# Patient Record
Sex: MALE | Race: OTHER | HISPANIC OR LATINO | Employment: FULL TIME | URBAN - METROPOLITAN AREA
[De-identification: names, ages, dates, MRNs, and addresses within clinical notes are randomized per-mention and may not be internally consistent; named-entity substitution may affect disease eponyms.]

---

## 2020-12-23 ENCOUNTER — HOSPITAL ENCOUNTER (EMERGENCY)
Facility: HOSPITAL | Age: 40
End: 2020-12-25
Attending: EMERGENCY MEDICINE
Payer: COMMERCIAL

## 2020-12-23 DIAGNOSIS — Z00.8 MEDICAL CLEARANCE FOR PSYCHIATRIC ADMISSION: Primary | ICD-10-CM

## 2020-12-23 DIAGNOSIS — T14.91XA SUICIDE ATTEMPT (HCC): ICD-10-CM

## 2020-12-23 LAB
ALBUMIN SERPL BCP-MCNC: 4.4 G/DL (ref 3–5.2)
ALP SERPL-CCNC: 64 U/L (ref 43–122)
ALT SERPL W P-5'-P-CCNC: 54 U/L (ref 9–52)
ANION GAP SERPL CALCULATED.3IONS-SCNC: 13 MMOL/L (ref 5–14)
APAP SERPL-MCNC: <10 UG/ML (ref 10–20)
AST SERPL W P-5'-P-CCNC: 46 U/L (ref 17–59)
BASOPHILS # BLD AUTO: 0 THOUSANDS/ΜL (ref 0–0.1)
BASOPHILS NFR BLD AUTO: 0 % (ref 0–1)
BILIRUB SERPL-MCNC: 0.4 MG/DL
BUN SERPL-MCNC: 13 MG/DL (ref 5–25)
CALCIUM SERPL-MCNC: 9 MG/DL (ref 8.4–10.2)
CHLORIDE SERPL-SCNC: 102 MMOL/L (ref 97–108)
CK SERPL-CCNC: 118 U/L (ref 55–170)
CO2 SERPL-SCNC: 28 MMOL/L (ref 22–30)
CREAT SERPL-MCNC: 0.89 MG/DL (ref 0.7–1.5)
EOSINOPHIL # BLD AUTO: 0.2 THOUSAND/ΜL (ref 0–0.4)
EOSINOPHIL NFR BLD AUTO: 3 % (ref 0–6)
ERYTHROCYTE [DISTWIDTH] IN BLOOD BY AUTOMATED COUNT: 12.9 %
ETHANOL SERPL-MCNC: <10 MG/DL (ref 0–10)
GFR SERPL CREATININE-BSD FRML MDRD: 108 ML/MIN/1.73SQ M
GLUCOSE SERPL-MCNC: 128 MG/DL (ref 70–99)
HCT VFR BLD AUTO: 42.4 % (ref 41–53)
HGB BLD-MCNC: 14.5 G/DL (ref 13.5–17.5)
LIPASE SERPL-CCNC: 200 U/L (ref 23–300)
LYMPHOCYTES # BLD AUTO: 3.5 THOUSANDS/ΜL (ref 0.5–4)
LYMPHOCYTES NFR BLD AUTO: 35 % (ref 25–45)
MAGNESIUM SERPL-MCNC: 2.2 MG/DL (ref 1.6–2.3)
MCH RBC QN AUTO: 30.7 PG (ref 26–34)
MCHC RBC AUTO-ENTMCNC: 34.2 G/DL (ref 31–36)
MCV RBC AUTO: 90 FL (ref 80–100)
MONOCYTES # BLD AUTO: 0.8 THOUSAND/ΜL (ref 0.2–0.9)
MONOCYTES NFR BLD AUTO: 8 % (ref 1–10)
NEUTROPHILS # BLD AUTO: 5.3 THOUSANDS/ΜL (ref 1.8–7.8)
NEUTS SEG NFR BLD AUTO: 54 % (ref 45–65)
PHOSPHATE SERPL-MCNC: 3.9 MG/DL (ref 2.5–4.8)
PLATELET # BLD AUTO: 258 THOUSANDS/UL (ref 150–450)
PMV BLD AUTO: 8.7 FL (ref 8.9–12.7)
POTASSIUM SERPL-SCNC: 3.7 MMOL/L (ref 3.6–5)
PROT SERPL-MCNC: 7.2 G/DL (ref 5.9–8.4)
RBC # BLD AUTO: 4.71 MILLION/UL (ref 4.5–5.9)
SALICYLATES SERPL-MCNC: <1 MG/DL (ref 10–30)
SODIUM SERPL-SCNC: 143 MMOL/L (ref 137–147)
WBC # BLD AUTO: 9.9 THOUSAND/UL (ref 4.5–11)

## 2020-12-23 PROCEDURE — 99285 EMERGENCY DEPT VISIT HI MDM: CPT

## 2020-12-23 PROCEDURE — NC001 PR NO CHARGE: Performed by: EMERGENCY MEDICINE

## 2020-12-23 PROCEDURE — 80320 DRUG SCREEN QUANTALCOHOLS: CPT | Performed by: EMERGENCY MEDICINE

## 2020-12-23 PROCEDURE — 84100 ASSAY OF PHOSPHORUS: CPT | Performed by: EMERGENCY MEDICINE

## 2020-12-23 PROCEDURE — 93005 ELECTROCARDIOGRAM TRACING: CPT

## 2020-12-23 PROCEDURE — 36415 COLL VENOUS BLD VENIPUNCTURE: CPT | Performed by: EMERGENCY MEDICINE

## 2020-12-23 PROCEDURE — 96361 HYDRATE IV INFUSION ADD-ON: CPT

## 2020-12-23 PROCEDURE — 99285 EMERGENCY DEPT VISIT HI MDM: CPT | Performed by: PHYSICIAN ASSISTANT

## 2020-12-23 PROCEDURE — 85025 COMPLETE CBC W/AUTO DIFF WBC: CPT | Performed by: EMERGENCY MEDICINE

## 2020-12-23 PROCEDURE — 96375 TX/PRO/DX INJ NEW DRUG ADDON: CPT

## 2020-12-23 PROCEDURE — 80329 ANALGESICS NON-OPIOID 1 OR 2: CPT | Performed by: EMERGENCY MEDICINE

## 2020-12-23 PROCEDURE — 96374 THER/PROPH/DIAG INJ IV PUSH: CPT

## 2020-12-23 PROCEDURE — 82550 ASSAY OF CK (CPK): CPT | Performed by: EMERGENCY MEDICINE

## 2020-12-23 PROCEDURE — 83735 ASSAY OF MAGNESIUM: CPT | Performed by: EMERGENCY MEDICINE

## 2020-12-23 PROCEDURE — 83690 ASSAY OF LIPASE: CPT | Performed by: EMERGENCY MEDICINE

## 2020-12-23 PROCEDURE — 80053 COMPREHEN METABOLIC PANEL: CPT | Performed by: EMERGENCY MEDICINE

## 2020-12-23 PROCEDURE — 0241U HB NFCT DS VIR RESP RNA 4 TRGT: CPT | Performed by: PHYSICIAN ASSISTANT

## 2020-12-23 RX ORDER — NALOXONE HYDROCHLORIDE 1 MG/ML
INJECTION INTRAMUSCULAR; INTRAVENOUS; SUBCUTANEOUS
Status: COMPLETED
Start: 2020-12-23 | End: 2020-12-23

## 2020-12-23 RX ORDER — METOCLOPRAMIDE HYDROCHLORIDE 5 MG/ML
10 INJECTION INTRAMUSCULAR; INTRAVENOUS ONCE
Status: COMPLETED | OUTPATIENT
Start: 2020-12-23 | End: 2020-12-23

## 2020-12-23 RX ORDER — ONDANSETRON 2 MG/ML
4 INJECTION INTRAMUSCULAR; INTRAVENOUS ONCE
Status: COMPLETED | OUTPATIENT
Start: 2020-12-23 | End: 2020-12-23

## 2020-12-23 RX ORDER — ONDANSETRON 2 MG/ML
INJECTION INTRAMUSCULAR; INTRAVENOUS
Status: COMPLETED
Start: 2020-12-23 | End: 2020-12-23

## 2020-12-23 RX ADMIN — METOCLOPRAMIDE 10 MG: 5 INJECTION, SOLUTION INTRAMUSCULAR; INTRAVENOUS at 23:07

## 2020-12-23 RX ADMIN — SODIUM CHLORIDE 1000 ML: 0.9 INJECTION, SOLUTION INTRAVENOUS at 23:07

## 2020-12-23 RX ADMIN — ONDANSETRON 4 MG: 2 INJECTION INTRAMUSCULAR; INTRAVENOUS at 23:07

## 2020-12-24 PROBLEM — F32.A DEPRESSION: Status: ACTIVE | Noted: 2020-12-24

## 2020-12-24 LAB
AMPHETAMINES SERPL QL SCN: NEGATIVE
ATRIAL RATE: 99 BPM
BARBITURATES UR QL: NEGATIVE
BENZODIAZ UR QL: NEGATIVE
COCAINE UR QL: NEGATIVE
FLUAV RNA RESP QL NAA+PROBE: NEGATIVE
FLUBV RNA RESP QL NAA+PROBE: NEGATIVE
METHADONE UR QL: NEGATIVE
OPIATES UR QL SCN: NEGATIVE
OXYCODONE+OXYMORPHONE UR QL SCN: POSITIVE
P AXIS: 46 DEGREES
PCP UR QL: NEGATIVE
PR INTERVAL: 156 MS
QRS AXIS: 30 DEGREES
QRSD INTERVAL: 98 MS
QT INTERVAL: 354 MS
QTC INTERVAL: 454 MS
RSV RNA RESP QL NAA+PROBE: NEGATIVE
SARS-COV-2 RNA RESP QL NAA+PROBE: NEGATIVE
T WAVE AXIS: 16 DEGREES
THC UR QL: NEGATIVE
VENTRICULAR RATE: 99 BPM

## 2020-12-24 PROCEDURE — 93010 ELECTROCARDIOGRAM REPORT: CPT | Performed by: INTERNAL MEDICINE

## 2020-12-24 PROCEDURE — 80307 DRUG TEST PRSMV CHEM ANLYZR: CPT | Performed by: PHYSICIAN ASSISTANT

## 2020-12-24 PROCEDURE — 96361 HYDRATE IV INFUSION ADD-ON: CPT

## 2020-12-24 RX ORDER — OLANZAPINE 10 MG/1
10 INJECTION, POWDER, LYOPHILIZED, FOR SOLUTION INTRAMUSCULAR EVERY 8 HOURS PRN
Status: CANCELLED | OUTPATIENT
Start: 2020-12-24

## 2020-12-24 RX ORDER — TRAZODONE HYDROCHLORIDE 100 MG/1
50 TABLET ORAL
Status: CANCELLED | OUTPATIENT
Start: 2020-12-24

## 2020-12-24 RX ORDER — RISPERIDONE 0.5 MG/1
0.5 TABLET, ORALLY DISINTEGRATING ORAL
Status: CANCELLED | OUTPATIENT
Start: 2020-12-24

## 2020-12-24 RX ORDER — HYDROXYZINE HYDROCHLORIDE 25 MG/1
25 TABLET, FILM COATED ORAL EVERY 4 HOURS PRN
Status: CANCELLED | OUTPATIENT
Start: 2020-12-24

## 2020-12-24 RX ORDER — OLANZAPINE 5 MG/1
5 TABLET ORAL EVERY 6 HOURS PRN
Status: CANCELLED | OUTPATIENT
Start: 2020-12-24

## 2020-12-24 RX ORDER — IBUPROFEN 400 MG/1
800 TABLET ORAL EVERY 8 HOURS PRN
Status: CANCELLED | OUTPATIENT
Start: 2020-12-24

## 2020-12-24 RX ORDER — LORAZEPAM 1 MG/1
1 TABLET ORAL ONCE
Status: COMPLETED | OUTPATIENT
Start: 2020-12-24 | End: 2020-12-24

## 2020-12-24 RX ORDER — LORAZEPAM 0.5 MG/1
0.5 TABLET ORAL EVERY 6 HOURS PRN
Status: CANCELLED | OUTPATIENT
Start: 2020-12-24

## 2020-12-24 RX ORDER — ACETAMINOPHEN 325 MG/1
650 TABLET ORAL EVERY 4 HOURS PRN
Status: CANCELLED | OUTPATIENT
Start: 2020-12-24

## 2020-12-24 RX ORDER — LISINOPRIL 10 MG/1
10 TABLET ORAL ONCE
Status: COMPLETED | OUTPATIENT
Start: 2020-12-24 | End: 2020-12-24

## 2020-12-24 RX ORDER — ACETAMINOPHEN 325 MG/1
650 TABLET ORAL EVERY 6 HOURS PRN
Status: CANCELLED | OUTPATIENT
Start: 2020-12-24

## 2020-12-24 RX ORDER — LORAZEPAM 1 MG/1
1 TABLET ORAL EVERY 8 HOURS PRN
Status: CANCELLED | OUTPATIENT
Start: 2020-12-24

## 2020-12-24 RX ORDER — HALOPERIDOL 5 MG
5 TABLET ORAL EVERY 6 HOURS PRN
Status: CANCELLED | OUTPATIENT
Start: 2020-12-24

## 2020-12-24 RX ADMIN — LISINOPRIL 10 MG: 10 TABLET ORAL at 03:13

## 2020-12-24 RX ADMIN — LORAZEPAM 1 MG: 1 TABLET ORAL at 14:01

## 2020-12-25 ENCOUNTER — HOSPITAL ENCOUNTER (INPATIENT)
Facility: HOSPITAL | Age: 40
LOS: 4 days | Discharge: HOME | DRG: 881 | End: 2020-12-29
Attending: STUDENT IN AN ORGANIZED HEALTH CARE EDUCATION/TRAINING PROGRAM | Admitting: STUDENT IN AN ORGANIZED HEALTH CARE EDUCATION/TRAINING PROGRAM
Payer: COMMERCIAL

## 2020-12-25 VITALS
RESPIRATION RATE: 18 BRPM | TEMPERATURE: 97.6 F | HEART RATE: 68 BPM | OXYGEN SATURATION: 100 % | DIASTOLIC BLOOD PRESSURE: 86 MMHG | SYSTOLIC BLOOD PRESSURE: 128 MMHG

## 2020-12-25 DIAGNOSIS — I10 ESSENTIAL HYPERTENSION: ICD-10-CM

## 2020-12-25 DIAGNOSIS — Z00.8 MEDICAL CLEARANCE FOR PSYCHIATRIC ADMISSION: ICD-10-CM

## 2020-12-25 DIAGNOSIS — F32.2 CURRENT SEVERE EPISODE OF MAJOR DEPRESSIVE DISORDER WITHOUT PSYCHOTIC FEATURES WITHOUT PRIOR EPISODE (HCC): Primary | ICD-10-CM

## 2020-12-25 RX ORDER — HALOPERIDOL 5 MG
5 TABLET ORAL EVERY 6 HOURS PRN
Status: DISCONTINUED | OUTPATIENT
Start: 2020-12-25 | End: 2020-12-29 | Stop reason: HOSPADM

## 2020-12-25 RX ORDER — LORAZEPAM 0.5 MG/1
0.5 TABLET ORAL EVERY 6 HOURS PRN
Status: DISCONTINUED | OUTPATIENT
Start: 2020-12-25 | End: 2020-12-26

## 2020-12-25 RX ORDER — ACETAMINOPHEN 325 MG/1
650 TABLET ORAL EVERY 4 HOURS PRN
Status: DISCONTINUED | OUTPATIENT
Start: 2020-12-25 | End: 2020-12-29 | Stop reason: HOSPADM

## 2020-12-25 RX ORDER — IBUPROFEN 800 MG/1
800 TABLET ORAL EVERY 8 HOURS PRN
Status: DISCONTINUED | OUTPATIENT
Start: 2020-12-25 | End: 2020-12-29 | Stop reason: HOSPADM

## 2020-12-25 RX ORDER — RISPERIDONE 0.5 MG/1
0.5 TABLET, ORALLY DISINTEGRATING ORAL
Status: DISCONTINUED | OUTPATIENT
Start: 2020-12-25 | End: 2020-12-29 | Stop reason: HOSPADM

## 2020-12-25 RX ORDER — LORAZEPAM 1 MG/1
1 TABLET ORAL EVERY 8 HOURS PRN
Status: DISCONTINUED | OUTPATIENT
Start: 2020-12-25 | End: 2020-12-25 | Stop reason: HOSPADM

## 2020-12-25 RX ORDER — ACETAMINOPHEN 325 MG/1
650 TABLET ORAL EVERY 6 HOURS PRN
Status: DISCONTINUED | OUTPATIENT
Start: 2020-12-25 | End: 2020-12-29 | Stop reason: HOSPADM

## 2020-12-25 RX ORDER — OLANZAPINE 10 MG/1
10 INJECTION, POWDER, LYOPHILIZED, FOR SOLUTION INTRAMUSCULAR EVERY 8 HOURS PRN
Status: DISCONTINUED | OUTPATIENT
Start: 2020-12-25 | End: 2020-12-29 | Stop reason: HOSPADM

## 2020-12-25 RX ORDER — ACETAMINOPHEN 325 MG/1
650 TABLET ORAL EVERY 8 HOURS PRN
Status: DISCONTINUED | OUTPATIENT
Start: 2020-12-25 | End: 2020-12-25 | Stop reason: HOSPADM

## 2020-12-25 RX ORDER — LORAZEPAM 1 MG/1
1 TABLET ORAL EVERY 8 HOURS PRN
Status: DISCONTINUED | OUTPATIENT
Start: 2020-12-25 | End: 2020-12-26

## 2020-12-25 RX ORDER — HYDROXYZINE HYDROCHLORIDE 25 MG/1
25 TABLET, FILM COATED ORAL EVERY 4 HOURS PRN
Status: DISCONTINUED | OUTPATIENT
Start: 2020-12-25 | End: 2020-12-29 | Stop reason: HOSPADM

## 2020-12-25 RX ORDER — OLANZAPINE 5 MG/1
5 TABLET ORAL EVERY 6 HOURS PRN
Status: DISCONTINUED | OUTPATIENT
Start: 2020-12-25 | End: 2020-12-29 | Stop reason: HOSPADM

## 2020-12-25 RX ORDER — IBUPROFEN 400 MG/1
400 TABLET ORAL EVERY 8 HOURS PRN
Status: DISCONTINUED | OUTPATIENT
Start: 2020-12-25 | End: 2020-12-25 | Stop reason: HOSPADM

## 2020-12-25 RX ORDER — TRAZODONE HYDROCHLORIDE 50 MG/1
50 TABLET ORAL
Status: DISCONTINUED | OUTPATIENT
Start: 2020-12-25 | End: 2020-12-29 | Stop reason: HOSPADM

## 2020-12-25 RX ADMIN — LORAZEPAM 1 MG: 1 TABLET ORAL at 12:32

## 2020-12-26 PROBLEM — F32.2 CURRENT SEVERE EPISODE OF MAJOR DEPRESSIVE DISORDER WITHOUT PSYCHOTIC FEATURES WITHOUT PRIOR EPISODE (HCC): Status: ACTIVE | Noted: 2020-12-24

## 2020-12-26 PROBLEM — Z00.8 MEDICAL CLEARANCE FOR PSYCHIATRIC ADMISSION: Status: ACTIVE | Noted: 2020-12-26

## 2020-12-26 PROBLEM — F32.9 MDD (MAJOR DEPRESSIVE DISORDER): Status: ACTIVE | Noted: 2020-12-24

## 2020-12-26 PROBLEM — I10 ESSENTIAL HYPERTENSION: Status: ACTIVE | Noted: 2017-06-22

## 2020-12-26 PROBLEM — E78.1 HYPERTRIGLYCERIDEMIA: Status: ACTIVE | Noted: 2017-06-22

## 2020-12-26 PROCEDURE — 99222 1ST HOSP IP/OBS MODERATE 55: CPT | Performed by: STUDENT IN AN ORGANIZED HEALTH CARE EDUCATION/TRAINING PROGRAM

## 2020-12-26 PROCEDURE — 99253 IP/OBS CNSLTJ NEW/EST LOW 45: CPT | Performed by: PHYSICIAN ASSISTANT

## 2020-12-26 RX ORDER — LORAZEPAM 1 MG/1
1 TABLET ORAL EVERY 8 HOURS PRN
Status: DISCONTINUED | OUTPATIENT
Start: 2020-12-26 | End: 2020-12-29 | Stop reason: HOSPADM

## 2020-12-26 RX ORDER — SERTRALINE HYDROCHLORIDE 25 MG/1
25 TABLET, FILM COATED ORAL ONCE
Status: COMPLETED | OUTPATIENT
Start: 2020-12-26 | End: 2020-12-26

## 2020-12-26 RX ORDER — LISINOPRIL 20 MG/1
20 TABLET ORAL DAILY
Status: DISCONTINUED | OUTPATIENT
Start: 2020-12-26 | End: 2020-12-29 | Stop reason: HOSPADM

## 2020-12-26 RX ADMIN — LISINOPRIL 20 MG: 20 TABLET ORAL at 15:46

## 2020-12-26 RX ADMIN — HYDROXYZINE HYDROCHLORIDE 25 MG: 25 TABLET, FILM COATED ORAL at 12:54

## 2020-12-26 RX ADMIN — TRAZODONE HYDROCHLORIDE 50 MG: 50 TABLET ORAL at 00:20

## 2020-12-26 RX ADMIN — SERTRALINE HYDROCHLORIDE 25 MG: 25 TABLET ORAL at 15:46

## 2020-12-26 NOTE — H&P
Psychiatric Evaluation - [facility] 45 y o  male MRN: [id number]  Unit/Bed#: Mackenzie Carrasco 057-11 Encounter: [id number]    Interview with Telephonic  #[id number]    Chief Complaint: "I woke up at the hospital after I took too many pills of Percocet "    History of Present Illness     Per ED Note by Alexandria Brennan on 12/24/20:  "Patient was brought to the ED by EMS after taking an overdose  Patient reports taking 70 mg  of Percocet and said his intent was to take his life  Drug screen shows positive for Oxycodone urine  Patient reports increased depression, anxiety, and racing thoughts  Patient has been feeling more depressed over the past month or so, but today he really felt alone  Patient reports that his wife threw him out in August and he also recently lost custody of his children  Although initially saying he wanted to leave, after crisis worker explained the severity of him taking action, he was agreeable to signing a 201  Patient has been very cooperative  Doctor agrees with the plan for inpatient treatment "    Patient was admitted to psychiatric unit on a voluntarily 201 commitment basis  Per Admission Interview with Dr New Knott on 12/26/2020:  45 y o    male, single, has 5 children (11, 9, 12, 16, 25 y/o), 2 of his children (13, 17 y/o) live with him, domiciled by himself in [facility], 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, no significant PPH, no past psychiatric hospitalizations, no past suicide attempts, no h/o self-injurious behaviors, no h/o physical aggression, PMH significant for hypertension, no active substance use, presents to [facility] psychiatry unit transferred from [address] [place] [address] ED for inpatient psychiatric hospitalization s/p suicide attempt by overdose of Percocet,with Dayton Rush reporting "I woke up at the hospital after I took too many pills of Percocet "    Per patient, patient reports that his wife left him about a year ago and kids were telling him that he wasn't a good father  He reports that there was a lot of stressors that were piling on  In August, the mother of his children left him  He reports that he never used Percocet before but knew if he took Percocet, it would kill him  He reports that his suicidal thoughts started on Wednesday after an argument with children's mother  He reports he subsequently bought Percocet and overdosed on it  He reports having trouble sleeping over the past few weeks, reports that he has trouble falling asleep, getting restless sleep, sleeping about 3-4 hours per night  He reports appetite has been very bad, reports lacking appetite, lost about 40 pounds in 3 months  He reports feeling very distractible  He reports his boss noticed this past week that he was very distracted  He reports that he is remorseful of suicide attempt and happy to be alive  On psychiatric ROS, patient denies any auditory or visual hallucinations, denies feelings of paranoia  Patient denies any h/o or current manic symptoms  Patient reports worries about children, worries about living situation  He reports panic attacks at times at night  Patient denies any recreational drug use  He reports drinking alcohol about once per week  Historical Information     Past Psychiatric History:   No significant PPH, no past psychiatric hospitalizations, no past suicide attempts, no h/o self-injurious behaviors, no h/o physical aggression  No current outpatient psychiatric providers  Past Psychiatric medication trial: None    Substance Abuse History:  Social History     Tobacco History     Smoking Status  Current Every Day Smoker Smoking Frequency  0 25 packs/day    Smokeless Tobacco Use  Never Used    Tobacco Comment  States he's only been smoking 5 cigs   for the last month          Alcohol History     Alcohol Use Status  Yes Drinks/Week  2 Cans of beer per week Amount  2 0 standard drinks of alcohol/wk Comment  States does not like alcohol          Drug Use     Drug Use Status  Not Currently Comment  Attempted OD off of what he suspected to be Percocet          Sexual Activity     Sexually Active  Not Currently          Activities of Daily Living    Not Asked               Additional Substance Use Detail     Questions Responses    Problems Due to Past Use of Alcohol? No    Problems Due to Past Use of Substances?  No    Substance Use Assessment Denies substance use within the past 12 months    Alcohol Use Frequency Denies use in past 12 months    Cannabis frequency Never used    Comment: Never used on 12/26/2020     Heroin Frequency Denies use in past 12 months    Last Use of Alcohol & Amount Except for 4 beers 12/23/20    Cocaine frequency Never used    Comment: Never used on 12/26/2020     Crack Cocaine Frequency Denies use in past 12 months    Methamphetamine Frequency Denies use in past 12 months    Narcotic Frequency Denies use in past 12 months    Benzodiazepine Frequency Denies use in past 12 months    Amphetamine frequency Denies use in past 12 months    Barbituate Frequency Denies use use in past 12 months    Inhalant frequency Never used    Comment: Never used on 12/26/2020     Hallucinogen frequency Never used    Comment: Never used on 12/26/2020     Ecstasy frequency Never used    Comment: Never used on 12/26/2020     Other drug frequency Never used    Comment: Never used on 12/26/2020     Opiate frequency Denies use in past 12 months    Opiate last use 12/23/20    Comment: 12/23/2020 on 12/26/2020     Last reviewed by Aleksandra Higgins RN on 12/26/2020      Alcohol- drinks about one per week  Cigarettes- about 5-6 cigs per day     I have assessed this patient for substance use within the past 12 months    Family Psychiatric History:   2 children- in counseling  Father's Side- a lot of people have committed suicide    Social History:  Education: 10th grade  , employed working with animals  No access to firearms  No current legal problems    Abuse History: No h/o physical or sexual abuse  Past Medical History:   Diagnosis Date    Depression        Medical Review Of Systems:  Comprehensive ROS was negative except as noted in HPI and abdominal pain and back pain  Meds/Allergies   all current active meds have been reviewed  No Known Allergies    Objective   Vital signs in last 24 hours:  Temp:  [96 2 °F (35 7 °C)-97 9 °F (36 6 °C)] 97 9 °F (36 6 °C)  HR:  [66-72] 72  Resp:  [16-18] 16  BP: (128-156)/(86-99) 156/99    Mental status:  Appearance sitting comfortably in chair, adequate hygiene and grooming, cooperative with interview, fairly well related, dressed in hospital gown   Mood "depressed"   Affect Appears constricted in depressed range, stable, mood-congruent   Speech Normal rate, rhythm, and volume   Thought Processes Linear and goal directed   Associations intact associations   Hallucinations Denies any auditory or visual hallucinations   Thought Content No passive or active suicidal or homicidal ideation, intent, or plan  Orientation Oriented to person, place, time, and situation   Recent and Remote Memory Grossly intact   Attention Span Concentration intact   Intellect Appears to be of Average Intelligence   Insight Insight intact   Judgement judgment was impaired   Muscle Strength Muscle strength and tone were normal   Language Within normal limits   Fund of Knowledge Age appropriate   Pain None       Patient Strengths/Assets: average or above intelligence, capable of independent living, cooperative, communication skills   Patient Barriers/Limitations: marital conflict, poor support system    Lab Results:   I have personally reviewed all pertinent laboratory/tests results    Labs in last 72 hours:   Recent Labs     12/23/20  2301   WBC 9 90   RBC 4 71   HGB 14 5   HCT 42 4      RDW 12 9   NEUTROABS 5 30   SODIUM 143   K 3 7   CL 102   CO2 28   BUN 13   CREATININE 0 89   GLUC 128*   CALCIUM 9 0   AST 46   ALT 54*   ALKPHOS 64   TP 7 2   ALB 4 4   TBILI 0 40         Code Status: Level 1 - Full Code    Assessment/Plan   Principal Problem:    Current severe episode of major depressive disorder without psychotic features without prior episode [facility])  Active Problems:    Hypertriglyceridemia    Essential hypertension    Medical clearance for psychiatric admission    45 y o   male, single, has 5 children (11, 9, 12, 16, 25 y/o), 2 of his children (13, 17 y/o) live with him, domiciled by himself in [facility], 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, no significant PPH, no past psychiatric hospitalizations, no past suicide attempts, no h/o self-injurious behaviors, no h/o physical aggression, PMH significant for hypertension, no active substance use, [facility] to [facility] transferred from [address] [place] [address] ED for inpatient psychiatric hospitalization s/p suicide attempt by overdose of Percocet,with Cheko Solorzano reporting "I woke up at the hospital after I took too many pills of Percocet "    On assessment today, patient with no prior psychiatric history presenting with significantly worsening depressive symptoms over the past year in context of separation from wife and difficulties with relationship with children as well as significant financial stressors leading up to a suicide attempt via overdose of multiple tablets of Percocet a few days ago leading up to hospitalization, in psychosocial context of marital stressors, housing stressors  Given severity symptoms and recent suicide attempt, patient is imminent risk to harm self would benefit from inpatient psychiatric hospitalization at this time      Plan:   Risks, benefits and possible side effects of Medications:   Risks, benefits, and possible side effects of medications explained to patient and patient verbalizes understanding  Plan:  1  Admit to [place] [id number] on 201 status for safety and treatment of MDD  2  No 1:1 CO needed at this time as patient feels safe on the unit  3  Psych- Will start Zoloft 25 mg daily for today, will titrate Zoloft to 50 mg daily starting tomorrow for mood and anxiety symptoms  May use Ativan 1 mg prn anxiety  4  Medical- Re-started Lisinopril 20 mg daily for hypertension  Reviewed admission labwork  5  CW to work outpatient dispo planning  Certification: Estimated length of stay: More than 2 midnights  I certify that inpatient services are medically necessary for this patient for a duration of greater that 2 midnights  See H&P and MD Progress Notes for additional information about the patient's course of treatment

## 2020-12-26 NOTE — NURSING NOTE
Pt was able to fall asleep shortly following completion of admission process, remained asleep throughout

## 2020-12-26 NOTE — PROGRESS NOTES
Spoke with patient with assistance of [employer] #[id number] [place]  Pt currently denies active thoughts/feeling of self harm but states the thoughts do continue to come to his head and he has attempted to last Wed  Pt states he currently has high anxiety and was administered prn atarax ,individual states that anxiety is due to his wife leaving and his 5 children not wanting a relationship with him  At this time pt was encourage to continue to work on [employer] as needed and express needs  Will continue to monitor

## 2020-12-26 NOTE — PROGRESS NOTES
Admission assessment completed using translation phone via  #[id number]  Pt  Is a mostly Indonesian-speaking 201 from [address] [place] [address], S/P an OD of 70 mg of percocet & 4 bottles of beer on 20  Pt  Was found unresponsive & Narcan was used on him  He denies any past Suicide attempt, is remorseful & makes a verbal safety contract  He also denies HI & A/V Hallucinations, admitting only to feeling that others  are speaking about him or laughing at him, NOT related to the language barrier  He has no known allergies  His only medical problem is HTN & he also has had a hernia repair  Pt  Indicates that he is remorseful & states that he has not been eating or sleeping well, since he requested custody of 2 of his children, (12 & 16, + a 24 yr  old as well) from [facility], as the mother of his children's  recently   Since moving here, the kids have become "rebellious" & are in therapy  Pt works as a  & still was sending money to their mother, & the situation has made he feel "worthless" "like trash" & feeling as though his daughters don't love him  He c/o rapid thoughts about the entire situation  He has never taken psychiatric medication, nor been in treatment, though now realizes he should have gone into therapy, too, as offered, but felt he could handle it  He is pleasant & cooperative during admission, asking appropriate questions  He denies any drug or alcohol problems  UDS was + for opiates only & Medical alcohol was less than 10  Was given Trazodone 50 mg  Po/PRN sleep at [address]  Good effect obtained, as observed asleep in bed within the hr  Will continue to monitor

## 2020-12-26 NOTE — ASSESSMENT & PLAN NOTE
·  Recommend checking lipid panel this admission   · Not on statin outpatient   · Outpatient follow-up with PCP

## 2020-12-26 NOTE — TREATMENT PLAN
TREATMENT PLAN REVIEW - 613 Keri Bryant 45 y o  1980 male MRN: [id number]    [address] [place] [address] Room / Bed: [address] 682-00 Encounter: [id number]          Admit Date/Time:  12/25/2020 10:55 PM    Treatment Team: Attending Provider: Selvin Kennedy MD; Patient Care Technician: Eligio Holley; Security: Jess Turk; : Hilario Rocha; Security: Jordan Parr;  Registered Nurse: Chad Davis LPN; Patient Care Technician: Johnie Zapien; Registered Nurse: Lin Nieto RN; Registered Nurse: David Hinkle RN; Patient Care Assistant: Pamela Grant    Diagnosis: Principal Problem:    Current severe episode of major depressive disorder without psychotic features without prior episode [facility])  Active Problems:    Hypertriglyceridemia    Essential hypertension    Medical clearance for psychiatric admission    Patient Strengths/Assets: average or above intelligence, capable of independent living, cooperative, communication skills     Patient Barriers/Limitations: marital conflict, poor support system    Short Term Goals: decrease in depressive symptoms, decrease in anxiety symptoms    Long Term Goals: improvement in depression, improvement in anxiety    Progress Towards Goals: starting psychiatric medications as prescribed    Recommended Treatment: medication management, patient medication education, group therapy, milieu therapy, continued Behavioral Health psychiatric evaluation/assessment process     Treatment Frequency: daily medication monitoring, group and milieu therapy daily, monitoring through interdisciplinary rounds, monitoring through weekly patient care conferences    Expected Discharge Date: 5 days - 12/31/2020    Discharge Plan: referral for outpatient medication management with a psychiatrist, referral for outpatient psychotherapy    Treatment Plan Created/Updated By: David Bell MD

## 2020-12-26 NOTE — TREATMENT TEAM
AM rounds: New admit - "primarily Andorran speaking" 201 after intentional OD on 70mg percocet (crushed and snorted)  Unresponsive and received Narcan  Increased anxiety/depression  B/u with SO in August 2020, lost custody of children  Pleasant, calm and cooperative  Slept overnight

## 2020-12-26 NOTE — TREATMENT PLAN
RN will meet with pt and assess for reason of admission  Pt will be encouraged to utilize healthy coping skills and to attend groups

## 2020-12-26 NOTE — ASSESSMENT & PLAN NOTE
· Admission labs reviewed, acceptable  · UDS positive for oxycodone   · COVID-19 negative  · EKG reveals NSR,   · Medically stable for continued inpatient psychiatric treatment

## 2020-12-26 NOTE — PLAN OF CARE
Problem: SELF HARM/SUICIDALITY  Goal: Will have no self-injury during hospital stay  Description: INTERVENTIONS:  - Q 15 MINUTES: Routine safety checks  - Q WAKING SHIFT & PRN: Assess risk to determine if routine checks are adequate to maintain patient safety  - Encourage patient to participate actively in care by formulating a plan to combat response to suicidal ideation, identify supports and resources  Outcome: Progressing     Problem: DEPRESSION  Goal: Will be euthymic at discharge  Description: INTERVENTIONS:  - Administer medication as ordered  - Provide emotional support via 1:1 interaction with staff  - Encourage involvement in milieu/groups/activities  - Monitor for social isolation  Outcome: Progressing     Problem: ANXIETY  Goal: Will report anxiety at manageable levels  Description: INTERVENTIONS:  - Administer medication as ordered  - Teach and encourage coping skills  - Provide emotional support  - Assess patient/family for anxiety and ability to cope  Outcome: Progressing  Goal: By discharge: Patient will verbalize 2 strategies to deal with anxiety  Description: Interventions:  - Identify any obvious source/trigger to anxiety  - Staff will assist patient in applying identified coping technique/skills  - Encourage attendance of scheduled groups and activities  Outcome: Progressing     Problem: DISCHARGE PLANNING  Goal: Discharge to home or other facility with appropriate resources  Description: INTERVENTIONS:  - Identify barriers to discharge w/patient and caregiver  - Arrange for needed discharge resources and transportation as appropriate  - Identify discharge learning needs (meds, wound care, etc )  - Arrange for interpretive services to assist at discharge as needed  - Refer to Case Management Department for coordinating discharge planning if the patient needs post-hospital services based on physician/advanced practitioner order or complex needs related to functional status, cognitive ability, or social support system  Outcome: Progressing

## 2020-12-26 NOTE — NURSING NOTE
Pt napping during evening  Pt calm and cooperative   [id number] used  Pt reported anxiety and depression during the morning and about his situation  Pt reported he feels he is the cause of all his families issues  Reviewed zoloft with pt  Pt denies any questions or concerns

## 2020-12-26 NOTE — CONSULTS
[address] Internal Medicine  Consult- Sukhi Arceo 1980, 45 y o  male MRN: [id number]  Unit/Bed#: Malu Sanchez 175-56 Encounter: [id number]  Primary Care Provider: No primary care provider on file  Date and time admitted to hospital: 12/25/2020 10:55 PM    Inpatient consult for Medical Clearance for [facility] patient  Consult performed by: Ciara Mathews PA-C  Consult ordered by: Volodymyr Yung MD        Medical clearance for psychiatric admission  Assessment & Plan  · Admission labs reviewed, acceptable  · UDS positive for oxycodone   · COVID-19 negative  · EKG reveals NSR,   · Medically stable for continued inpatient psychiatric treatment    Essential hypertension  Assessment & Plan  ·  Continue lisinopril 20 mg daily  · Continue to monitor blood pressure    Hypertriglyceridemia  Assessment & Plan  ·  Recommend checking lipid panel this admission   · Not on statin outpatient   · Outpatient follow-up with PCP      VTE Prophylaxis: Reason for no pharmacologic prophylaxis low risk  / reason for no mechanical VTE prophylaxis ambulatory     Recommendations for Discharge:  ·  discharge timeline per primary team   Routine follow-up with primary care provider  Counseling / Coordination of Care Time: 30 minutes  Greater than 50% of total time spent on patient counseling and coordination of care  Collaboration of Care: Were Recommendations Directly Discussed with Primary Treatment Team? - No     History of Present Illness:    Sukhi Arceo is a 45 y o  male who is originally admitted to the behavioral health service due to   Intentional oxycodone overdose  We are consulted for  Management of chronic medical conditions  Interview was performed with the assistance of [employer]    Patient  Reports a past medical history of hypertension for which he takes 20 mg lisinopril daily, all prior to admission medications should be continued    Patient denies recent travel, illness or sick contacts  Patient denies smoking, drinking or drug use  Available admission lab work and vitals are acceptable  Patient feels a baseline physical health  Patient appears medically stable for inpatient psychiatric treatment at this time  Review of Systems:    Review of Systems   Psychiatric/Behavioral: Positive for self-injury and suicidal ideas  All other systems reviewed and are negative  Past Medical and Surgical History:     Past Medical History:   Diagnosis Date    Depression        No past surgical history on file  Meds/Allergies:    PTA meds:   None       Allergies: No Known Allergies    Social History:     Marital Status: Unknown    Substance Use History:   Social History     Substance and Sexual Activity   Alcohol Use Yes    Alcohol/week: 2 0 standard drinks    Types: 2 Cans of beer per week    Frequency: Monthly or less    Drinks per session: 1 or 2    Comment: States does not like alcohol     Social History     Tobacco Use   Smoking Status Current Every Day Smoker    Packs/day: 0 25   Smokeless Tobacco Never Used   Tobacco Comment    States he's only been smoking 5 cigs  for the last month     Social History     Substance and Sexual Activity   Drug Use Not Currently    Comment: Attempted OD off of what he suspected to be Percocet       Family History:    History reviewed  No pertinent family history  Physical Exam:     Vitals:   Blood Pressure: 130/86 (12/26/20 0726)  Pulse: 66 (12/26/20 0726)  Temperature: (!) 97 4 °F (36 3 °C) (12/26/20 0726)  Temp Source: Tympanic (12/26/20 0726)  Respirations: 16 (12/26/20 0726)  Height: 5' 8" (172 7 cm) (12/25/20 2301)  Weight - Scale: 85 3 kg (188 lb) (12/25/20 2301)  SpO2: 97 % (12/25/20 2301)    Physical Exam  Constitutional:       General: He is awake  He is not in acute distress  HENT:      Head: Normocephalic and atraumatic  Cardiovascular:      Rate and Rhythm: Normal rate and regular rhythm  Heart sounds: No murmur  Pulmonary:      Effort: Pulmonary effort is normal       Breath sounds: Normal breath sounds  Abdominal:      General: There is no distension  Palpations: Abdomen is soft  Musculoskeletal:      Right lower leg: No edema  Left lower leg: No edema  Skin:     General: Skin is warm and dry  Neurological:      Mental Status: He is alert  Comments: CN II-XII grossly intact         Additional Data:     Lab Results: I have personally reviewed pertinent reports  Results from last 7 days   Lab Units 12/23/20  2301   WBC Thousand/uL 9 90   HEMOGLOBIN g/dL 14 5   HEMATOCRIT % 42 4   PLATELETS Thousands/uL 258   NEUTROS PCT % 54   LYMPHS PCT % 35   MONOS PCT % 8   EOS PCT % 3     Results from last 7 days   Lab Units 12/23/20  2301   SODIUM mmol/L 143   POTASSIUM mmol/L 3 7   CHLORIDE mmol/L 102   CO2 mmol/L 28   BUN mg/dL 13   CREATININE mg/dL 0 89   ANION GAP mmol/L 13   CALCIUM mg/dL 9 0   ALBUMIN g/dL 4 4   TOTAL BILIRUBIN mg/dL 0 40   ALK PHOS U/L 64   ALT U/L 54*   AST U/L 46   GLUCOSE RANDOM mg/dL 128*             No results found for: HGBA1C            Imaging: I have personally reviewed pertinent reports  No orders to display       EKG, Pathology, and Other Studies Reviewed on Admission:   · EKG: NSR,     ** Please Note: This note has been constructed using a voice recognition system   **

## 2020-12-27 LAB — TSH SERPL DL<=0.05 MIU/L-ACNC: 0.28 UIU/ML (ref 0.36–3.74)

## 2020-12-27 PROCEDURE — 84443 ASSAY THYROID STIM HORMONE: CPT | Performed by: STUDENT IN AN ORGANIZED HEALTH CARE EDUCATION/TRAINING PROGRAM

## 2020-12-27 PROCEDURE — 99232 SBSQ HOSP IP/OBS MODERATE 35: CPT | Performed by: STUDENT IN AN ORGANIZED HEALTH CARE EDUCATION/TRAINING PROGRAM

## 2020-12-27 RX ADMIN — LISINOPRIL 20 MG: 20 TABLET ORAL at 08:41

## 2020-12-27 RX ADMIN — SERTRALINE HYDROCHLORIDE 50 MG: 50 TABLET ORAL at 08:41

## 2020-12-27 NOTE — PROGRESS NOTES
Brayan Pako has been visible on unit quiet social on approach,with the use of cyacrom   Arti# pt denied SI,HI and depression/anxeity  Pt states," he is doing better and has talked with his son I had a good conversation"  Denies any needs at this time  Will continue to monitor

## 2020-12-27 NOTE — PROGRESS NOTES
Progress Note - [address] [facility] Ext 45 y o  male MRN: [id number]  Unit/Bed#: David Sparks 520-35 Encounter: [id number]    Interviewed via Telephonic Y[employer] interperter #[id number]    Subjective:    Per nursing, patient calm and cooperative worried about family issues, slept through the night, visible on unit, social on approach  Per patient, patient reports doing well today, denying any problems over night or today  He reports that the new medication has been going okay so far  He reports that his mood has been "relaxed," denying feelings of anger or irritability  He reports that he slept well last night, denying trouble falling or staying asleep  He reports appetite has been okay  He reports some anxiety last night  He denies any passive or active suicidal ideation, intent, or plan  He reports some anxiety a bit  Behavior over the last 24 hours:  improved  Medication side effects: No  ROS: no complaints    Objective:    Temp:  [96 2 °F (35 7 °C)-98 °F (36 7 °C)] 97 2 °F (36 2 °C)  HR:  [64-70] 64  Resp:  [17-18] 18  BP: (131-158)/(76-90) 158/90    Mental Status Evaluation:  Appearance:  sitting comfortably in chair, dressed in casual clothing, adequate hygiene and grooming, cooperative with interview, fairly well related   Behavior:  No tics, tremors, or behaviors observed   Speech:  Normal rate, rhythm, and volume   Mood:  "relaxed"   Affect:  Appears mildly constricted in depressed range, stable, mood-congruent   Thought Process:  Linear and goal directed   Associations intact associations   Thought Content:  No passive or active suicidal or homicidal ideation, intent, or plan     Perceptual Disturbances: Denies any auditory or visual hallucinations   Sensorium:  Oriented to person, place, time, and situation   Memory:  recent and remote memory grossly intact   Consciousness:  alert and awake   Attention: attention span and concentration were age appropriate   Insight:  fair   Judgment: fair Gait/Station: normal gait/station   Motor Activity: no abnormal movements       Labs: I have personally reviewed all pertinent laboratory/tests results  Labs in last 72 hours:   Recent Labs     12/27/20  0606   [id number] 0 281*       Progress Toward Goals: Progressing    Recommended Treatment: Continue with group therapy, milieu therapy and occupational therapy  Risks, benefits and possible side effects of Medications:   Risks, benefits, and possible side effects of medications explained to patient and patient verbalizes understanding  Medications: all current active meds have been reviewed  Current Facility-Administered Medications   Medication Dose Route Frequency Provider Last Rate    acetaminophen  650 mg Oral Q6H PRN Keny Michelle, MD      acetaminophen  650 mg Oral Q4H PRN Keny Michelle, MD      haloperidol  5 mg Oral Q6H PRN Keny Michelle, MD      hydrOXYzine HCL  25 mg Oral Q4H PRN Keny Michelle, MD      ibuprofen  800 mg Oral Q8H PRN Keny Michelle, MD      influenza vaccine  0 5 mL Intramuscular Once Keny Michelle, MD      lisinopril  20 mg Oral Daily Keny Michelle, MD      LORazepam  1 mg Oral Q8H PRN Keny Michelle, MD      nicotine polacrilex  4 mg Oral Q2H PRN Keny Michelle, MD      OLANZapine  10 mg Intramuscular Q8H PRN Keny Michelle, MD      OLANZapine  5 mg Oral Q6H PRN Keny Michelle, MD      risperiDONE  0 5 mg Oral Q3H PRN Keny Michelle, MD      sertraline  50 mg Oral Daily Keny Michelle, MD      traZODone  50 mg Oral HS PRN Keny Michelle MD             Assessment/Plan   Principal Problem:    Current severe episode of major depressive disorder without psychotic features without prior episode [facility])  Active Problems:    Hypertriglyceridemia    Essential hypertension    Medical clearance for psychiatric admission    46 y/o Male with MDD, hypertension- continues to have improving mood, still having some anxiety symptoms, some stomach upset today  Plan:  -Continue current med regimen     -Monitor hypertension, may need adjustment of Lisinopril

## 2020-12-27 NOTE — TREATMENT TEAM
AM rounds: Calm, cooperative  Visible in milieu during day, napping on/off throughout evening  Denies SI/HI/AVH  Remorseful for OD  Slept overnight w/ Trazodone

## 2020-12-28 PROCEDURE — 99232 SBSQ HOSP IP/OBS MODERATE 35: CPT | Performed by: STUDENT IN AN ORGANIZED HEALTH CARE EDUCATION/TRAINING PROGRAM

## 2020-12-28 RX ADMIN — SERTRALINE HYDROCHLORIDE 50 MG: 50 TABLET ORAL at 10:47

## 2020-12-28 RX ADMIN — LISINOPRIL 20 MG: 20 TABLET ORAL at 10:47

## 2020-12-28 NOTE — PROGRESS NOTES
Progress Note - Behavioral Health   Elen Almazan 45 y o  male MRN: [id number]  Unit/Bed#: Gal Velasquez 706-47 Encounter: [id number]    Assessment/Plan   Principal Problem:    Current severe episode of major depressive disorder without psychotic features without prior episode [facility])  Active Problems:    Hypertriglyceridemia    Essential hypertension    Medical clearance for psychiatric admission    Spoke with the help of  services [id number]  Subjective: Patient was seen, chart reviewed and case discussed with team  This 29 yo male admitted to inpatient for OD on pills in the context of psychosocial stressors  Patient says that he took pills in after an argument with family as they were not respecting or caring for him  Reports that he spoke with family and cleared their differences  He is feeling much better now  Denies SI/HI/AH/BVH  He is visible on the unit participating in groups and activities       Psychiatric Review of Systems:  Behavior over the last 24 hours:  improved  Sleep: normal  Appetite: normal  Medication side effects: No  ROS: no complaints, all others negative    Current Medications:  Current Facility-Administered Medications   Medication Dose Route Frequency    acetaminophen (TYLENOL) tablet 650 mg  650 mg Oral Q6H PRN    acetaminophen (TYLENOL) tablet 650 mg  650 mg Oral Q4H PRN    haloperidol (HALDOL) tablet 5 mg  5 mg Oral Q6H PRN    hydrOXYzine HCL (ATARAX) tablet 25 mg  25 mg Oral Q4H PRN    ibuprofen (MOTRIN) tablet 800 mg  800 mg Oral Q8H PRN    influenza vaccine, quadrivalent (FLUARIX) IM injection 0 5 mL  0 5 mL Intramuscular Once    lisinopril (ZESTRIL) tablet 20 mg  20 mg Oral Daily    LORazepam (ATIVAN) tablet 1 mg  1 mg Oral Q8H PRN    nicotine polacrilex (NICORETTE) gum 4 mg  4 mg Oral Q2H PRN    OLANZapine (ZyPREXA) IM injection 10 mg  10 mg Intramuscular Q8H PRN    OLANZapine (ZyPREXA) tablet 5 mg  5 mg Oral Q6H PRN    risperiDONE (RisperDAL M-TABS) dispersible tablet 0 5 mg  0 5 mg Oral Q3H PRN    sertraline (ZOLOFT) tablet 50 mg  50 mg Oral Daily    traZODone (DESYREL) tablet 50 mg  50 mg Oral HS PRN       Behavioral Health Medications:   all current active meds have been reviewed, continue current psychiatric medications and current meds:   Current Facility-Administered Medications   Medication Dose Route Frequency    acetaminophen (TYLENOL) tablet 650 mg  650 mg Oral Q6H PRN    acetaminophen (TYLENOL) tablet 650 mg  650 mg Oral Q4H PRN    haloperidol (HALDOL) tablet 5 mg  5 mg Oral Q6H PRN    hydrOXYzine HCL (ATARAX) tablet 25 mg  25 mg Oral Q4H PRN    ibuprofen (MOTRIN) tablet 800 mg  800 mg Oral Q8H PRN    influenza vaccine, quadrivalent (FLUARIX) IM injection 0 5 mL  0 5 mL Intramuscular Once    lisinopril (ZESTRIL) tablet 20 mg  20 mg Oral Daily    LORazepam (ATIVAN) tablet 1 mg  1 mg Oral Q8H PRN    nicotine polacrilex (NICORETTE) gum 4 mg  4 mg Oral Q2H PRN    OLANZapine (ZyPREXA) IM injection 10 mg  10 mg Intramuscular Q8H PRN    OLANZapine (ZyPREXA) tablet 5 mg  5 mg Oral Q6H PRN    risperiDONE (RisperDAL M-TABS) dispersible tablet 0 5 mg  0 5 mg Oral Q3H PRN    sertraline (ZOLOFT) tablet 50 mg  50 mg Oral Daily    traZODone (DESYREL) tablet 50 mg  50 mg Oral HS PRN     Vitals:  Vitals:    12/28/20 1512   BP: 146/79   Pulse: 89   Resp: 17   Temp: 98 1 °F (36 7 °C)   SpO2:        Laboratory results:  I have personally reviewed all pertinent laboratory/tests results      Mental Status Evaluation:  Appearance:  age appropriate and casually dressed   Behavior:  normal   Speech:  normal pitch and normal volume   Mood:  anxious   Affect:  mood-congruent   Language Appropriate   Thought Process:  goal directed and logical   Thought Content:  normal   Perceptual Disturbances: None   Risk Potential: Suicidal Ideations none, Homicidal Ideations none and Potential for Aggression No   Sensorium:  person, place, time/date, situation, day of week, month of year and year   Cognition:  recent and remote memory grossly intact   Consciousness:  alert    Attention: attention span appeared shorter than expected for age   Insight:  fair   Judgment: fair   Gait/Station: normal gait/station   Motor Activity: no abnormal movements     Progress Toward Goals: Progressing slow    Recommended Treatment: Continue with pharmacotherapy, group therapy, milieu therapy and occupational therapy

## 2020-12-28 NOTE — DISCHARGE INSTR - APPOINTMENTS
Earl Burroughs RN, our [employer], will be calling you after your discharge, on the phone number that you provided  She will be available as an additional support, if needed  If you wish to speak with her, you may contact Kecia Molina at [phone]

## 2020-12-28 NOTE — NURSING NOTE
Pt visible on the unit  Spoke language (Arabic) to Adrianne, for extensive time  Stating he feels much better today and only want to go home to be with family and continue working at his job  He has concerns of losing his job if he stays here much longer  He is compliant taking medications, attending groups, observed listening and at times interacting  Calm, cooperative, polite to staff and peers

## 2020-12-28 NOTE — PROGRESS NOTES
Language line utilized #[id number]  Pleasant and appropriate during interaction  Denies SI/HI and hallucinations  States he spoke with his children and had a good conversation and he feels confident now that they reassured him that they love him  Reports mild anxiety, refused PRN medications  Anxious to discharge and return to work  Remorseful for recent OD  Agreeable to flu shot to and denies questions

## 2020-12-28 NOTE — CASE MANAGEMENT
CM met with Pt & utilized the [employer] phone   # [phone] provided English/Macanese interpretation  CM reviewed with Pt possible discharge for tomorrow & he confirmed that he had spoken with his boss, Jen Reeves, & he agreed to provide transportation at noon tomorrow  CM reviewed that a provider, Naty Hilario, had been contacted & they had available to complete an intake next Tues or Thurs, either in person or over the phone  Pt agreed with appointment for Thurs over the phone & asked that he be contacted on Ernesto's phone @ [phone]  Pt said that he is feeling much better since starting the medication  CM & Pt talked about a return to work note & Pt said that he talked to his boss & he will return to work on Monday  Pt said that he spoke with his girlfriend, & C&Y were going to talk to his youngest daughter today  Pt said that C&Y have been to visit them before  CM asked if Pt needed any documentation to provide them, & he said he didn't think so  Pt had no questions about his discharge plans  CM contacted [facility]y Hilario counseling @ [phone] & was able to schedule Pt an intake appointment for 1/7 at 6 PM [facility]  CM outreached to [facility] regarding medication management & the first available appointment is 4/27  CM contacted Pam's office & asked who they refer to for medication management, for Macanese speaking individuals & they reported Dr Dejuan Cohen        CM attempted to contact Dr Mamie Haynes office @ [phone] & was told that they are accepting new patients, however, the  asked that CM call back at 9 AM tomorrow to complete the referral

## 2020-12-28 NOTE — PLAN OF CARE
Problem: SELF HARM/SUICIDALITY  Goal: Will have no self-injury during hospital stay  Description: INTERVENTIONS:  - Q 15 MINUTES: Routine safety checks  - Q WAKING SHIFT & PRN: Assess risk to determine if routine checks are adequate to maintain patient safety  - Encourage patient to participate actively in care by formulating a plan to combat response to suicidal ideation, identify supports and resources  Outcome: Progressing     Problem: DEPRESSION  Goal: Will be euthymic at discharge  Description: INTERVENTIONS:  - Administer medication as ordered  - Provide emotional support via 1:1 interaction with staff  - Encourage involvement in milieu/groups/activities  - Monitor for social isolation  Outcome: Progressing     Problem: ANXIETY  Goal: Will report anxiety at manageable levels  Description: INTERVENTIONS:  - Administer medication as ordered  - Teach and encourage coping skills  - Provide emotional support  - Assess patient/family for anxiety and ability to cope  Outcome: Progressing  Goal: By discharge: Patient will verbalize 2 strategies to deal with anxiety  Description: Interventions:  - Identify any obvious source/trigger to anxiety  - Staff will assist patient in applying identified coping technique/skills  - Encourage attendance of scheduled groups and activities  Outcome: Progressing     Problem: Nutrition/Hydration-ADULT  Goal: Nutrient/Hydration intake appropriate for improving, restoring or maintaining nutritional needs  Description: Monitor and assess patient's nutrition/hydration status for malnutrition  Collaborate with interdisciplinary team and initiate plan and interventions as ordered  Monitor patient's weight and dietary intake as ordered or per policy  Utilize nutrition screening tool and intervene as necessary  Determine patient's food preferences and provide high-protein, high-caloric foods as appropriate       INTERVENTIONS:  - Monitor oral intake, urinary output, labs, and treatment plans  - Assess nutrition and hydration status and recommend course of action  - Evaluate amount of meals eaten  - Assist patient with eating if necessary   - Allow adequate time for meals  - Recommend/ encourage appropriate diets, oral nutritional supplements, and vitamin/mineral supplements  - Order, calculate, and assess calorie counts as needed  - Recommend, monitor, and adjust tube feedings and TPN/PPN based on assessed needs  - Assess need for intravenous fluids  - Provide specific nutrition/hydration education as appropriate  - Include patient/family/caregiver in decisions related to nutrition  Outcome: Progressing

## 2020-12-28 NOTE — CASE MANAGEMENT
CM met with Pt & utilized they [employer] phone   # [phone] provided English/Niuean interpretation  CM & Pt reviewed & signed ROIs for Donald Marie(megha)  Pt is a 44 y/o male who reported that he has 3 older children in [facility] ages 24, 25, & 12  He said that their mom is in long term & her  , so they were sent to [facility] to stay with Pt  Pt said that they arrived, blaming him for everything that happened  Pt said that he has a wife with other kids, ages 11 & 9 & she kicked him out in August, because it was too stressful & too financially hard for them all to be in the same house  Pt said that he is staying with his boss, & he can return there at discharge  Pt is  from his wife & was asked to leave the home in August   Pt said that his parents are living in [facility] & he has 1 brother  Pt reported a family history of multiple family members committing suicide  Pt denied any past inpatient or outpatient treatment for mental health  Pt reported that he has felt depressed in the past, but could get out of it  The said that this time he felt completely alone & he founds pills & took a whole bunch  Pt is in agreement with referrals for therapy & medication management  Pt does not have a PCP & denied any medical concerns  Pt denied any drug or alcohol use; Pt's UDS +oxycodone  Pt reported that he smokes about 8 cigarettes/day  Pt reported that he works for [employer] said that it is [employer] for [employer]  Pt is living with his boss & said that he will need a return to work note at discharge  Pt denied any legal issues  Pt denied having access to firearms  Pt reported that friends help with transportation as needed  Pt reported that his thoughts come crashing down on him in the mornings & his kids tell him he is trash  Pt said he would like to be able to stop those thoughts

## 2020-12-28 NOTE — DISCHARGE INSTR - OTHER ORDERS
[facility] es un servicio de soporte telefónico confidencial de 7 días a la semana atendido por consumidores capacitados de esteban mental  Warmline opera todos los días adryan no puede aceptar [employer] las 2 AM y las 6 AM  Warmline andrew soporte, escucha y puede brindar información sobre los servicios disponibles  W[facility] se especializa en las preocupaciones de los consumidores de esteban mental, todd familias y [place]  Sin embargo, también estamos aquí para cualquier persona que tenga un problema de esteban mental, esté confundido o simplemente no sepa nada acerca de la esteban mental o dónde obtener información  Para comunicarse con Albdaniel [place]wood [phone] y acepta las llamadas de 6:00 a m  a 10:00 a m  y de 4:00 p m  a 12:00 a m  Ysabel Velez is a confidential 7 days/week telephone support service manned by trained mental health consumers  W[facility]line operates daily but is not able to accept calls between 2AM-6AM    W[facility]line provides support, a listening ear and can provide information about available services  [facility] specializes in the concerns of mental health consumers, their families and friends  However, we are also here for anyone who has a mental health concern, is confused about or just doesn't know anything about mental health or where to get information  To reach Ysabel Velez, call [phone] accepts calls between 6:00 AM to 10:00 AM and from 4:00 PM to 12:00 AM      Crisis Text Line es gratis, soporte 24/7 para aquellos en crisis  Envíe un mensaje de texto a Home to [phone] desde cualquier lugar de [place]  Para enviar un mensaje de texto con un consejero de crisis capacitado  Crisis Text Line is free, 24/7 support for those in crisis  Text Home to [phone] from anywhere in the [place] to text with a trained Crisis Counselor      Intervención de Crisis en el [place] de [facility]: servicios telefónicos y de crisis móviles autorizados que brindan evaluaciones de esteban mental a todos los grupos de [place], independientemente de los ingresos o el seguro  Crisis Intervention opera las 24 horas / los 7 días de la [place]  La intervención de crisis del condado de [place] a los consumidores que están experimentando julissa emergencia de esteban mental y carecen de los recursos para ayudarse a sí mismos  La intervención inmediata para las personas suicidas y deprimidas con visitas domiciliarias / Beaula Mast es de máxima prioridad  La crisis se puede contactar al [phone]  [facility] ([facility]) AT [facility] - licensed telephone and mobile crisis services that provide mental health assessments to all age groups regardless of income or insurance  Crisis Intervention operates 24-hour/7 days a week  [address] [place] [address] assists consumer who are experiencing a mental health emergency and lack the resources to assist themselves  Immediate intervention for suicidal and depressed individuals with home visits/outreach being top priority  Crisis can be contacted at [phone]  Bra[place] 2-1-1 para conectarse / obtener ayuda  Información y referencias gratuitas y confidenciales disponibles las 24 horas, los 7 días de la semana: servicios de envejecimiento, servicios para niños y [place], asesoramiento, educación / [place], [employer] / Sharin Venkat / ropa, servicios de esteban, crianza de los hijos, abuso de Marc, grupos de [facility], oportunidades de voluntariado y [place]  Dial 2-1-1 to get connected/get help  Free, confidential information & referral available 24/7: Aging Services, Child & Youth Services, Counseling, Education/Training, Food/Shelter/Clothing, Health Services, Parenting, Substance Abuse, Support Groups, Volunteer Opportunities, & much more  Phone: 2-1-1 or [phone], Web: JWQ [email or web address] [email or web address], Email: [email or web address] [email or web address]        What you need to know aboutcoronavirus disease 2019 (COVID-19)     What is coronavirus disease 2019 (COVID-19)?    Coronavirus disease 2019 (COVID-19) is a respiratory illness that can spread from person to person  The virus that causes COVID-19 is a novel coronavirus that was first identified during an investigation into an outbreak in [place], [place]  Can people in the [place]  get COVID-19? Yes  COVID-19 is spreading from person to person in parts of the [facility]  Risk of infection with COVID-19 is higher for people who are close contacts of someone known to have COVID-19, for example healthcare workers, or household members  Other people at higher risk for infection are those who live in or have recently been in an area with ongoing spread of COVID-19  Learn more about places with ongoing spread at   [facility]  html#geographic  Have there been cases of COVID-19 in the [place] ?   Yes  The first case of COVID-19 in the [place] was reported on January 21, 2020  The current count of cases of COVID-19 in the [place] is available on [place] at [facility]  How does COVID-19 spread? The virus that causes COVID-19 probably emerged from an animal source, but is now spreading from person to person  The virus is thought to spread mainly between people who are in close contact with one another (within about 6 feet) through respiratory droplets produced when an infected person coughs or sneezes  It also may be possible that a person can get COVID-19 by touching a surface or object that has the virus on it and then touching their own mouth, nose, or possibly their eyes, but this is not thought to be the main way the virus spreads  Learn what is known about the spread of newly emerged coronaviruses at [facility]  What are the symptoms of COVID-19? Patients with COVID-19 have had mild to severe respiratory illness with symptoms of   fever   cough   shortness of breath  What are severe complications from this virus? Some patients have pneumonia in both lungs, multi-organ failure and in some cases death  How can I help protect myself? People can help protect themselves from respiratory illness with everyday preventive actions  Avoid close contact with people who are sick  Avoid touching your eyes, nose, and mouth withunwashed hands  Wash your hands often with soap and water for at least 20 seconds  Use an alcohol-based hand  that contains at least 60% alcohol if soap and water are not available  If you are sick, to keep from spreading respiratory illness to others, you should   Stay home when you are sick  Cover your cough or sneeze with a tissue, then throw the tissue in the trash  Clean and disinfect frequently touched objectsand surfaces  What should I do if I recently traveled from an area with ongoing spread of COVID-19? If you have traveled from an affected area, there may be restrictions on your movements for up to 2 weeks  If you develop symptoms during that period (fever, cough, trouble breathing), seek medical advice  Call the office of your health care provider before you go, and tell them about your travel and your symptoms  They will give you instructions on how to get care without exposing other people to your illness  While sick, avoid contact with people, don't go out and delay any travel to reduce the possibility of spreading illness to others  Is there a vaccine? There is currently no vaccine to protect against COVID-19  The best way to prevent infection is to take everyday preventive actions, like avoiding close contact with people who are sick and washing your hands often  Is there a treatment? There is no specific antiviral treatment for COVID-19  People with COVID-19 can seek medical care to helprelieve symptoms    For more information: www cdc gov/HVZVD11MY 356818-J 03/03/2020       What to do if you are sick withcoronavirus disease 2019 (COVID-19)     If you are sick with COVID-19 or suspect you are infected with the virus that causes COVID-19, follow the steps below to help prevent the disease from spreading to people in your home and community  Stay home except to get medical care   You should restrict activities outside your home, except for getting medical care  Do not go to work, school, or public areas  Avoid using public transportation, ride-sharing, or taxis  Separate yourself from other people and animals inyour home  People: As much as possible, you should stay in a specific room and away from other people in your home  Also, you should use a separate bathroom, if available  Animals: Do not handle pets or other animals while sick  See COVID-19 and Animals for more information  Call ahead before visiting your doctor   If you have a medical appointment, call the healthcare provider and tell them that you have or may have COVID-19  This will help the healthcare provider's office take steps to keep other people from getting infected or exposed  Wear a facemask  You should wear a facemask when you are around other people (e g , sharing a room or vehicle) or pets and before you enter a healthcare provider's office  If you are not able to wear a facemask (for example, because it causes trouble breathing), then people who live with you should not stay in the same room with you, or they should wear a facemask if they enteryour room  Cover your coughs and sneezes   Cover your mouth and nose with a tissue when you cough or sneeze  Throw used tissues in a lined trash can; immediately wash your hands with soap and water for at least 20 seconds or clean your hands with an alcohol-based hand  that contains at least 60 to 95% alcohol, covering all surfaces of your hands and rubbing them together until they feel dry  Soap and water should be used preferentially if hands are visibly dirty    Avoid sharing personal household items   You should not share dishes, drinking glasses, cups, eating utensils, towels, or bedding with other people or pets in your home  After using these items, they should be washed thoroughly with soap and water  Clean your hands often  Wash your hands often with soap and water for at least 20 seconds  If soap and water are not available, clean your hands with an alcohol-based hand  that contains at least 60% alcohol, covering all surfaces of your hands and rubbing them together until they feel dry  Soap and water should be used preferentially if hands are visibly dirty  Avoid touching your eyes, nose, and mouth with unwashed hands  Clean all "high-touch" surfaces every day  High touch surfaces include counters, tabletops, doorknobs, bathroom fixtures, toilets, phones, keyboards, tablets, and bedside tables  Also, clean any surfaces that may have blood, stool, or body fluids on them  Use a household cleaning spray or wipe, according to the label instructions  Labels contain instructions for safe and effective use of the cleaning product including precautions you should take when applying the product, such as wearing gloves and making sure you have good ventilation during use of the product  Monitor your symptoms  Seek prompt medical attention if your illness is worsening (e g , difficulty breathing)  Before seeking care, call your healthcare provider and tell them that you have, or are being evaluated for, COVID-19  Put on a facemask before you enter the facility  These steps will help the healthcare provider's office to keep other people in the office or waiting room from getting infectedor exposed  Ask your healthcare provider to call the local or state health department  Persons who are placed under active monitoring or facilitated self-monitoring should follow instructions provided by their local health department or occupational health professionals, as appropriate     If you have a medical emergency and need to call 911, notify the dispatch personnel that you have, or are being evaluated for COVID-19  If possible, put on a facemask before emergency medical services arrive  Discontinuing home isolation  Patients with confirmed COVID-19 should remain under home isolation precautions until the risk of secondary transmission to others is thought to be low  The decision to discontinue home isolation precautions should be made on a case-by-case basis, in consultation with healthcare providers and state and local health departments  For more information: www cdc gov/DLFEN10BQ 669780-I 02/24/2020       Stay home when you are sick,except to get medical care  Wash your hands often with soap and water for at least 20 seconds  Cover your cough or sneeze with a tissue, then throw the tissue in the trash  Clean and disinfect frequently touched objects and surfaces  Avoid touching your eyes, nose, and mouth  STOP THE SPREAD OF GERMS  For more information: www cdc gov/COVID19 Avoid close contact with people who are sick  Help prevent the spread of respiratory diseases like COVID-19

## 2020-12-29 VITALS
SYSTOLIC BLOOD PRESSURE: 141 MMHG | RESPIRATION RATE: 20 BRPM | OXYGEN SATURATION: 97 % | TEMPERATURE: 96.5 F | HEIGHT: 68 IN | DIASTOLIC BLOOD PRESSURE: 87 MMHG | BODY MASS INDEX: 28.34 KG/M2 | WEIGHT: 187 LBS | HEART RATE: 94 BPM

## 2020-12-29 PROCEDURE — 90686 IIV4 VACC NO PRSV 0.5 ML IM: CPT | Performed by: STUDENT IN AN ORGANIZED HEALTH CARE EDUCATION/TRAINING PROGRAM

## 2020-12-29 PROCEDURE — 99239 HOSP IP/OBS DSCHRG MGMT >30: CPT | Performed by: STUDENT IN AN ORGANIZED HEALTH CARE EDUCATION/TRAINING PROGRAM

## 2020-12-29 PROCEDURE — 90471 IMMUNIZATION ADMIN: CPT | Performed by: STUDENT IN AN ORGANIZED HEALTH CARE EDUCATION/TRAINING PROGRAM

## 2020-12-29 RX ORDER — LISINOPRIL 20 MG/1
20 TABLET ORAL DAILY
Qty: 30 TABLET | Refills: 0 | Status: SHIPPED | OUTPATIENT
Start: 2020-12-30

## 2020-12-29 RX ADMIN — INFLUENZA VIRUS VACCINE 0.5 ML: 15; 15; 15; 15 SUSPENSION INTRAMUSCULAR at 09:31

## 2020-12-29 RX ADMIN — SERTRALINE HYDROCHLORIDE 50 MG: 50 TABLET ORAL at 08:34

## 2020-12-29 RX ADMIN — LISINOPRIL 20 MG: 20 TABLET ORAL at 08:34

## 2020-12-29 NOTE — DISCHARGE INSTRUCTIONS
Depresión en adultos mayores   LO QUE NECESITA SABER:   La depresión es julissa afección que causa sentimientos de tristeza o desesperanza que no desaparecen  La persona puede perder interés en las cosas que solía disfrutar  La depresión es común en los adultos [place] no es julissa parte normal del envejecimiento  El tratamiento es muy importante y puede ayudar a mejorar la jodie diaria de la persona  Usted puede ayudar a apoyar a la persona animándola a trabajar con los médicos para controlar la depresión  INSTRUCCIONES SOBRE EL REANNA HOSPITALARIA:   Llame al Jenean Hany de emergencias local (911 en los [place]) si:  · Escucha a la persona hablar de hacerse daño a sí misma o a alguien más  · La persona nichols hecho algo intencionalmente para hacerse daño  Llame al terapeuta o médico de la persona si:  · Piensa que los síntomas de la persona no están mejorando  · Usted nota signos nuevos o la persona le dice que está teniendo síntomas nuevos  · Usted tiene preguntas o inquietudes acerca de la condición o el cuidado de la persona  Dónde buscar más ayuda si bertha que la persona está considerando suicidarse: Lo siguiente está disponible en cualquier momento:  · National Suicide Prevention Lifeline (línea de prevención del suicidio): 5-551.620.1142 (1-800-273-TALK)     · Teléfono directo para hablar de suicidio: 4-735-032-812.390.4475 (0-481-YJVJPAS)     · Para obtener julissa lista de números internacionales: https://save org/find-help/international-resources/    Medicamentos: Es importante que los médicos conozcan todos los medicamentos que la persona está tomando  Standard ayudará a los médicos a saber qué medicamentos recomendar para la persona  La persona también podría necesitar ayuda para establecer recordatorios para dick el medicamento todos los días  · Antidepresivos pueden darse para mejorar o equilibrar el estado de ánimo de la persona   Es posible que deba dick estos medicamentos regina varias semanas antes de empezar a sentirse mejor  · [place] todd medicamentos joes rafael se le haya indicado  Consulte con day médico si usted bertha que day medicamento no le está ayudando o si presenta efectos secundarios  Infórmele si es alérgico a algún medicamento  Mantenga julissa lista actualizada de los [place], las vitaminas y los productos herbales que sheldon  Incluya los siguientes datos de los medicamentos: cantidad, frecuencia y motivo de administración  Traiga con usted la lista o los envases de las píldoras a todd citas de seguimiento  Lleve la lista de los medicamentos con usted en reinier de julissa emergencia  La terapia se utiliza a menudo junto con medicamentos para aliviar la depresión  La terapia es un lugar para que la persona hable sobre todd sentimientos y todo lo que puede estar causando la depresión  La terapia se puede realizar jannie o en christina  También podría realizarse con todd familiares o con day yadira  Lo que puede hacer para ayudar a la persona a controlar la depresión:  · Llame, visite o envíe tarjetas postales a martha persona frecuentemente  Esté atento a la persona tras la pérdida de un cónyuge, un gran amigo o julissa mascota  Los [employer], cumpleaños y aniversarios pueden ser difíciles para Francies Sourav persona después de Francies Sourav pérdida  La pérdida de un cónyuge puede ser dolorosa y [place] para los adultos mayores que estuvieron casados por [employer]  · Ayude a la persona a vincularse con otras personas  Anímela a participar en la comunidad  Algunos ejemplos incluyen la tutoría de un estudiante joven o el voluntariado en julissa organización local  La persona podría necesitar algo de ayuda para usar julissa computadora o crear Leonard Sheehan de correo electrónico para poder estar en contacto con otras personas  También puede ayudar a organizar julissa visita para la persona con day líder religioso o espiritual     · Ayúdele a la persona a conseguir el equipo necesario para mejorar day comodidad y movilidad   Carmelina Colindres son los dispositivos para los oídos, anteojos, libros de [place] donna y [place]  Estos artículos pueden ayudar a la persona a disfrutar de actividades y sentirse más independiente  · Anímelo a hacer cosas nuevas  [place] puede ayudar a la persona a encontrar nuevos intereses o a conocer gente nueva  También puede ayudar a prevenir que se concentre en la depresión  · Anímelo a que siga [employer] y yendo a terapia  Los medicamentos y la terapia pueden ayudar a mejorar la esteban mental de la persona  · Ayude a la persona a hacer ejercicio de Marzetta Manus  El ejercicio puede levantar day estado de ánimo, aumentar day energía y hacer que dormir sea más fácil  Si es posible, ofrézcase para hacer ejercicio con la persona  Por ejemplo, puede programar caminatas con la persona  Puede que a la persona le guste ir a un evento, jose rafael julissa exposición de enrique o un museo  Si la persona no puede caminar, puede disfrutar de un programa de ejercicios realizado en julissa silla  · Anímelo a buscar ayuda para tratar el consumo de drogas o alcohol, de ser necesario  Las drogas y el alcohol pueden aumentar los pensamientos suicidas y hacer que la persona tenga más posibilidades de ponerlos en acción  Acuda a las consultas de control con el terapeuta o médico de la persona jose rafael se lo indicaron: El médico de la persona controlará day progreso en [address]  También monitoreará todd medicamentos si la persona está tomando antidepresivos  El [employer] preguntará si los medicamentos le están ayudando  Dígale acerca de cualquier efecto secundario o problema que usted note en la persona, o que la persona menciona  Podría ser necesario cambiar el tipo o cantidad de [place]  Anote todd preguntas y las de la persona para que recuerde [employer] citas  © Copyright [address] Information is for End User's use only and may not be sold, redistributed or otherwise used for commercial purposes   All illustrations and images included in CareNotes® are the copyrighted property of A D A M , Inc  or [address] [place] [address] es sólo para uso en educación  Day intención no es darle un consejo médico sobre enfermedades o tratamientos  Colsulte con day Katia Rivers farmacéutico antes de seguir cualquier régimen médico para saber si es seguro y efectivo para usted

## 2020-12-29 NOTE — PROGRESS NOTES
Pt relaxing in bed  Agreed to speak with writer  Pt pleasant  Denied all symptoms and ready to go home tomorrow  Asked pt to come to nurse's station if he has any questions or concerns  Pt acknowledged

## 2020-12-29 NOTE — PLAN OF CARE
Problem: SELF HARM/SUICIDALITY  Goal: Will have no self-injury during hospital stay  Description: INTERVENTIONS:  - Q 15 MINUTES: Routine safety checks  - Q WAKING SHIFT & PRN: Assess risk to determine if routine checks are adequate to maintain patient safety  - Encourage patient to participate actively in care by formulating a plan to combat response to suicidal ideation, identify supports and resources  Outcome: Progressing     Problem: DEPRESSION  Goal: Will be euthymic at discharge  Description: INTERVENTIONS:  - Administer medication as ordered  - Provide emotional support via 1:1 interaction with staff  - Encourage involvement in milieu/groups/activities  - Monitor for social isolation  Outcome: Progressing     Problem: ANXIETY  Goal: Will report anxiety at manageable levels  Description: INTERVENTIONS:  - Administer medication as ordered  - Teach and encourage coping skills  - Provide emotional support  - Assess patient/family for anxiety and ability to cope  Outcome: Progressing  Goal: By discharge: Patient will verbalize 2 strategies to deal with anxiety  Description: Interventions:  - Identify any obvious source/trigger to anxiety  - Staff will assist patient in applying identified coping technique/skills  - Encourage attendance of scheduled groups and activities  Outcome: Progressing     Problem: Nutrition/Hydration-ADULT  Goal: Nutrient/Hydration intake appropriate for improving, restoring or maintaining nutritional needs  Description: Monitor and assess patient's nutrition/hydration status for malnutrition  Collaborate with interdisciplinary team and initiate plan and interventions as ordered  Monitor patient's weight and dietary intake as ordered or per policy  Utilize nutrition screening tool and intervene as necessary  Determine patient's food preferences and provide high-protein, high-caloric foods as appropriate       INTERVENTIONS:  - Monitor oral intake, urinary output, labs, and treatment plans  - Assess nutrition and hydration status and recommend course of action  - Evaluate amount of meals eaten  - Assist patient with eating if necessary   - Allow adequate time for meals  - Recommend/ encourage appropriate diets, oral nutritional supplements, and vitamin/mineral supplements  - Order, calculate, and assess calorie counts as needed  - Recommend, monitor, and adjust tube feedings and TPN/PPN based on assessed needs  - Assess need for intravenous fluids  - Provide specific nutrition/hydration education as appropriate  - Include patient/family/caregiver in decisions related to nutrition  Outcome: Progressing     Problem: DISCHARGE PLANNING  Goal: Discharge to home or other facility with appropriate resources  Description: INTERVENTIONS:  - Identify barriers to discharge w/patient and caregiver  - Arrange for needed discharge resources and transportation as appropriate  - Identify discharge learning needs (meds, wound care, etc )  - Arrange for interpretive services to assist at discharge as needed  - Refer to Case Management Department for coordinating discharge planning if the patient needs post-hospital services based on physician/advanced practitioner order or complex needs related to functional status, cognitive ability, or social support system  Outcome: Progressing     Problem: Ineffective Coping  Goal: Participates in unit activities  Description: Interventions:  - Provide therapeutic environment   - Provide required programming   - Redirect inappropriate behaviors   Outcome: Progressing

## 2020-12-29 NOTE — NURSING NOTE
Language line utilized CORBIN #[id number]  Awake and alert  Expressed readiness for discharge  Denies SI/HI  Improved mood  Reports children and boss are a support  Encouraged compliance with medications and OP appointments  No questions or concerns

## 2020-12-29 NOTE — PLAN OF CARE
Problem: SELF HARM/SUICIDALITY  Goal: Will have no self-injury during hospital stay  Description: INTERVENTIONS:  - Q 15 MINUTES: Routine safety checks  - Q WAKING SHIFT & PRN: Assess risk to determine if routine checks are adequate to maintain patient safety  - Encourage patient to participate actively in care by formulating a plan to combat response to suicidal ideation, identify supports and resources  Outcome: Adequate for Discharge     Problem: DEPRESSION  Goal: Will be euthymic at discharge  Description: INTERVENTIONS:  - Administer medication as ordered  - Provide emotional support via 1:1 interaction with staff  - Encourage involvement in milieu/groups/activities  - Monitor for social isolation  Outcome: Adequate for Discharge     Problem: ANXIETY  Goal: Will report anxiety at manageable levels  Description: INTERVENTIONS:  - Administer medication as ordered  - Teach and encourage coping skills  - Provide emotional support  - Assess patient/family for anxiety and ability to cope  Outcome: Adequate for Discharge  Goal: By discharge: Patient will verbalize 2 strategies to deal with anxiety  Description: Interventions:  - Identify any obvious source/trigger to anxiety  - Staff will assist patient in applying identified coping technique/skills  - Encourage attendance of scheduled groups and activities  Outcome: Adequate for Discharge     Problem: Nutrition/Hydration-ADULT  Goal: Nutrient/Hydration intake appropriate for improving, restoring or maintaining nutritional needs  Description: Monitor and assess patient's nutrition/hydration status for malnutrition  Collaborate with interdisciplinary team and initiate plan and interventions as ordered  Monitor patient's weight and dietary intake as ordered or per policy  Utilize nutrition screening tool and intervene as necessary  Determine patient's food preferences and provide high-protein, high-caloric foods as appropriate       INTERVENTIONS:  - Monitor oral intake, urinary output, labs, and treatment plans  - Assess nutrition and hydration status and recommend course of action  - Evaluate amount of meals eaten  - Assist patient with eating if necessary   - Allow adequate time for meals  - Recommend/ encourage appropriate diets, oral nutritional supplements, and vitamin/mineral supplements  - Order, calculate, and assess calorie counts as needed  - Recommend, monitor, and adjust tube feedings and TPN/PPN based on assessed needs  - Assess need for intravenous fluids  - Provide specific nutrition/hydration education as appropriate  - Include patient/family/caregiver in decisions related to nutrition  Outcome: Adequate for Discharge     Problem: DISCHARGE PLANNING  Goal: Discharge to home or other facility with appropriate resources  Description: INTERVENTIONS:  - Identify barriers to discharge w/patient and caregiver  - Arrange for needed discharge resources and transportation as appropriate  - Identify discharge learning needs (meds, wound care, etc )  - Arrange for interpretive services to assist at discharge as needed  - Refer to Case Management Department for coordinating discharge planning if the patient needs post-hospital services based on physician/advanced practitioner order or complex needs related to functional status, cognitive ability, or social support system  Outcome: Adequate for Discharge     Problem: Ineffective Coping  Goal: Participates in unit activities  Description: Interventions:  - Provide therapeutic environment   - Provide required programming   - Redirect inappropriate behaviors   Outcome: Adequate for Discharge

## 2020-12-29 NOTE — BH TRANSITION RECORD
Contact Information: If you have any questions, concerns, pended studies, tests and/or procedures, or emergencies regarding your inpatient behavioral health visit  Please contact [facility] [facility] [facility] unit [phone] and ask to speak to a , nurse or physician  A contact is available 24 hours/ 7 days a week at this number  Summary of Procedures Performed During your Stay:  Below is a list of major procedures performed during your hospital stay and a summary of results:  - Cardiac Procedures/Studies: EKG  Pending Studies (From admission, onward)    None        If studies are pending at discharge, follow up with your PCP and/or referring provider

## 2020-12-29 NOTE — DISCHARGE SUMMARY
Discharge Summary - [address] [facility] Ext 45 y o  male MRN: [id number]  Unit/Bed#: [facility] [place] 884-02 Encounter: [id number]     Admission Date:   Admission Orders (From admission, onward)     Ordered        12/25/20 2308  ED TO DIFFERENT CAMPUS  [address] [facility] [address] UNIT or INPATIENT MEDICAL UNIT to [facility] UNIT (using Discharge Readmit Navigator) - Admit Patient to [address] [place] [address]  Once                         Discharge Date: 12/29/2020    Attending Psychiatrist: Joleen Ayoub MD    Reason for Admission/HPI:   History of Present Illness     Patient is a 28-year-old male who presented to [address] [place] [address] ED after intentional overdose of 70mg of Percocet  Precipitating event is poor support system where his wife threw him out of the house in August and recently losing custody of his children  He denied active drug problem  On initial psychiatric evaluation patient reported after an argument with his children's mother is when he impulsively overdosed  He reported over the last few weeks increased depression with symptoms of poor sleep, lack of appetite, unintentional weight loss, low energy, inability to concentrate, and suicidal thoughts  He did report increased anxiety without panic attacks  He denied psychosis and delusional material   He did not show signs of babak  He does not have history of babak    Patient denied previous inpatient psychiatric hospitalizations, denied previous suicide attempts, and did not have current outpatient psychiatrist or therapist     Psychosocial Stressors:  [facility] Course:   Behavioral Health Medications:   current meds:   Current Facility-Administered Medications   Medication Dose Route Frequency    acetaminophen (TYLENOL) tablet 650 mg  650 mg Oral Q6H PRN    acetaminophen (TYLENOL) tablet 650 mg  650 mg Oral Q4H PRN    haloperidol (HALDOL) tablet 5 mg  5 mg Oral Q6H PRN    hydrOXYzine HCL (ATARAX) tablet 25 mg  25 mg Oral Q4H PRN    ibuprofen (MOTRIN) tablet 800 mg  800 mg Oral Q8H PRN    influenza vaccine, quadrivalent (FLUARIX) IM injection 0 5 mL  0 5 mL Intramuscular Once    lisinopril (ZESTRIL) tablet 20 mg  20 mg Oral Daily    LORazepam (ATIVAN) tablet 1 mg  1 mg Oral Q8H PRN    nicotine polacrilex (NICORETTE) gum 4 mg  4 mg Oral Q2H PRN    OLANZapine (ZyPREXA) IM injection 10 mg  10 mg Intramuscular Q8H PRN    OLANZapine (ZyPREXA) tablet 5 mg  5 mg Oral Q6H PRN    risperiDONE (RisperDAL M-TABS) dispersible tablet 0 5 mg  0 5 mg Oral Q3H PRN    sertraline (ZOLOFT) tablet 50 mg  50 mg Oral Daily    traZODone (DESYREL) tablet 50 mg  50 mg Oral HS PRN       Patient was admitted to [facility] inpatient psychiatric unit on voluntary 201 commitment for safety and stabilization  On admission patient was placed on Zoloft 25mg QD for depression/anxiety  Zoloft was increased to 50mg QD  He did require PRN anxiolytics early on but symptoms did improve  He tolerated medications with no acute side effects  He was quickly focused on discharge  His mood brightened over the course of his treatment, and he was seen in [facility] interacting appropriately with peers  He did not demonstrate dangerous behavior to self or others during his inpatient stay  On day of discharge patient had reduced depression, controllable anxiety, denied psychosis, did not show signs of babak, and denied suicidal/homicidal ideations  : [id number]      Mental Status at time of Discharge:     Appearance:  casually dressed   Behavior:  cooperative   Speech:  normal pitch and normal volume   Mood:  euthymic   Affect:  mood-congruent   Thought Process:  normal   Thought Content:  normal   Perceptual Disturbances: None   Risk Potential: Denied SI/HI    Potential for aggression: No   Sensorium:  person, place and time/date   Cognition:  recent and remote memory grossly intact   Consciousness:  alert and awake    Attention: attention span and concentration were age appropriate   Insight:  fair   Judgment: fair   Gait/Station: normal gait/station and normal balance   Motor Activity: no abnormal movements       Discharge Diagnosis:   Current severe episode of major depressive disorder without psychotic features without prior episode      Discharge Medications:  See after visit summary for reconciled discharge medications provided to patient and family  Discharge instructions/Information to patient and family:   See after visit summary for information provided to patient and family  Provisions for Follow-Up Care:  See after visit summary for information related to follow-up care and any pertinent home health orders  Discharge Statement   I spent 34 minutes discharging the patient  This time was spent on the day of discharge  I had direct contact with the patient on the day of discharge  On day of discharge patient had mental status exam performed, discharge instructions/medications reviewed, and outpatient planning discussed  He was given 1 month plus 3 refills of scripts  He denied tobacco cessation therapy after discharge      Aline Toure PA-C

## 2020-12-29 NOTE — PROGRESS NOTES
12/29/20 0815   Team Meeting   Meeting Type Daily Rounds   Team Members Present   Team Members Present Physician;Nurse;   Physician Team Member Evelyn Raines,   Nursing Team Member [facility] Management Team Member Jen   Patient/Family Present   Patient Present No   Patient's Family Present No     Patient denies all symptoms  Patient will be discharged today

## 2020-12-30 NOTE — PROGRESS NOTES
Treatment Plan Meeting:  Diagnosis of Major Depressive Disorder without psychotic features without prior episode reviewed  Discussed short term goals for decrease in depression & anxiety  Pt agreed with referrals for outpatient therapy & medication management; CM will work to identify [address] speaking providers for Pt  At this time, discharge is tentative for Tuesday  All parties in agreement & treatment plan was signed       12/28/20 0915   Team Meeting   Meeting Type Tx Team Meeting   Initial Conference Date 12/28/20   Next Conference Date 01/22/21   Team Members Present   Team Members Present Physician;   Physician Team Member Dr Julián Rosario Management Team Member Elisa Chavarria   Patient/Family Present   Patient Present No   Patient's Family Present No

## 2020-12-30 NOTE — PROGRESS NOTES
Status: Pt is a 89 from [facility] ER who presented status post overdosing on Percocets (70)  Pt was found unresponsive & given narcan  Pt has some paranoia, believing that people are talking about him  Pt denied any SI/HI & is pleasant & cooperative  Pt is primarily Irish speaking     Medication: Zoloft increased to 50mg / no PRNs  D/C: Tues / Pt agreed with referrals to outpatient     12/28/20 0750   Team Meeting   Meeting Type Daily Rounds   Team Members Present   Team Members Present Physician;Nurse;;Occupational Therapist   Physician Team Member Dr Jerry Cárdenas / Ward General Team Member [facility] Management Team Member Tristan Lepe / Vladimir Brown / Kendall Ojeda   OT Team Member Trung   Patient/Family Present   Patient Present No   Patient's Family Present No

## 2021-04-27 ENCOUNTER — TELEPHONE (OUTPATIENT)
Dept: PSYCHIATRY | Facility: CLINIC | Age: 41
End: 2021-04-27

## 2021-04-27 NOTE — TELEPHONE ENCOUNTER
----- Message from Sean Courser sent at 4/27/2021 11:32 AM EDT -----  Regarding: Patient No Show  Hannahsamuel Samantha No Showed 04/27/21  for Harish Ryan MD and did not call with proper notice to Cx appt   They are marked as no show for their new patient visit

## 2021-04-28 ENCOUNTER — TELEPHONE (OUTPATIENT)
Dept: PSYCHIATRY | Facility: CLINIC | Age: 41
End: 2021-04-28

## 2021-04-28 NOTE — TELEPHONE ENCOUNTER
NO-SHOW LETTER MAILED TO Smith Issa    ADDRESS: [address]  [address] [place] [address]

## 2022-01-19 ENCOUNTER — HOSPITAL ENCOUNTER (EMERGENCY)
Facility: HOSPITAL | Age: 42
Discharge: HOME | End: 2022-01-19
Attending: EMERGENCY MEDICINE

## 2022-01-19 VITALS
BODY MASS INDEX: 31.71 KG/M2 | HEART RATE: 68 BPM | OXYGEN SATURATION: 97 % | SYSTOLIC BLOOD PRESSURE: 156 MMHG | DIASTOLIC BLOOD PRESSURE: 87 MMHG | WEIGHT: 208.56 LBS | RESPIRATION RATE: 18 BRPM | TEMPERATURE: 98.3 F

## 2022-01-19 DIAGNOSIS — H66.92 LEFT OTITIS MEDIA: Primary | ICD-10-CM

## 2022-01-19 PROCEDURE — 99282 EMERGENCY DEPT VISIT SF MDM: CPT

## 2022-01-19 PROCEDURE — 99284 EMERGENCY DEPT VISIT MOD MDM: CPT | Performed by: EMERGENCY MEDICINE

## 2022-01-19 RX ORDER — AMOXICILLIN 500 MG/1
500 CAPSULE ORAL EVERY 12 HOURS SCHEDULED
Qty: 14 CAPSULE | Refills: 0 | Status: SHIPPED | OUTPATIENT
Start: 2022-01-19 | End: 2022-01-26

## 2022-01-19 NOTE — ED PROVIDER NOTES
History  Chief Complaint   Patient presents with    Earache     left ear pain x5 days, motrin taken last night     Patient is a 52-year-old male who presents with a 5 day history of left ear pain  Constant, non radiating  Decreased  Hearing  No fever  No congestion  Took motrin with little relief  No cough  Prior to Admission Medications   Prescriptions Last Dose Informant Patient Reported? Taking?   lisinopril (ZESTRIL) 20 mg tablet   No No   Sig: Take 1 tablet (20 mg total) by mouth daily   sertraline (ZOLOFT) 50 mg tablet   No No   Sig: Take 1 tablet (50 mg total) by mouth daily At 9am      Facility-Administered Medications: None       Past Medical History:   Diagnosis Date    Depression        History reviewed  No pertinent surgical history  History reviewed  No pertinent family history  I have reviewed and agree with the history as documented  E-Cigarette/Vaping    E-Cigarette Use Never User      E-Cigarette/Vaping Substances    Nicotine No     THC No     CBD No     Flavoring No     Other No     Unknown No      Social History     Tobacco Use    Smoking status: Former Smoker     Packs/day: 0 25    Smokeless tobacco: Never Used    Tobacco comment: States he's only been smoking 5 cigs  for the last month   Vaping Use    Vaping Use: Never used   Substance Use Topics    Alcohol use: Yes     Alcohol/week: 2 0 standard drinks     Types: 2 Cans of beer per week     Comment: weekends    Drug use: Not Currently     Comment: Attempted OD off of what he suspected to be Percocet       Review of Systems   Constitutional: Negative  HENT: Positive for ear pain  Eyes: Negative  Respiratory: Negative  Cardiovascular: Negative  Gastrointestinal: Negative  Endocrine: Negative  Genitourinary: Negative  Musculoskeletal: Negative  Skin: Negative  Allergic/Immunologic: Negative  Neurological: Negative  Hematological: Negative      Psychiatric/Behavioral: Negative  All other systems reviewed and are negative  Physical Exam  Physical Exam  Vitals and nursing note reviewed  Constitutional:       Appearance: Normal appearance  He is normal weight  HENT:      Head: Normocephalic  Right Ear: Tympanic membrane, ear canal and external ear normal       Left Ear: Tenderness present  Tympanic membrane is injected and erythematous  Tympanic membrane has decreased mobility  Nose: Nose normal  No congestion or rhinorrhea  Mouth/Throat:      Mouth: Mucous membranes are moist       Pharynx: Oropharynx is clear  Cardiovascular:      Rate and Rhythm: Normal rate and regular rhythm  Pulses: Normal pulses  Heart sounds: Normal heart sounds  Pulmonary:      Effort: Pulmonary effort is normal       Breath sounds: Normal breath sounds  Musculoskeletal:      Cervical back: Normal range of motion and neck supple  Skin:     General: Skin is warm and dry  Capillary Refill: Capillary refill takes less than 2 seconds  Neurological:      General: No focal deficit present  Mental Status: He is alert     Psychiatric:         Mood and Affect: Mood normal          Behavior: Behavior normal          Vital Signs  ED Triage Vitals [01/19/22 1123]   Temperature Pulse Respirations Blood Pressure SpO2   98 3 °F (36 8 °C) 68 18 156/87 97 %      Temp Source Heart Rate Source Patient Position - Orthostatic VS BP Location FiO2 (%)   Oral Monitor Sitting Left arm --      Pain Score       --           Vitals:    01/19/22 1123   BP: 156/87   Pulse: 68   Patient Position - Orthostatic VS: Sitting         Visual Acuity      ED Medications  Medications - No data to display    Diagnostic Studies  Results Reviewed     None                 No orders to display              Procedures  Procedures         ED Course                                             MDM  Number of Diagnoses or Management Options      Disposition  Final diagnoses:   Left otitis media Time reflects when diagnosis was documented in both MDM as applicable and the Disposition within this note     Time User Action Codes Description Comment    1/19/2022 11:35 AM Wilhemena Abts Add [H66 92] Left otitis media       ED Disposition     ED Disposition Condition Date/Time Comment    Discharge Stable Wed Jan 19, 2022 11:35 AM Shandra Washington discharge to home/self care  Follow-up Information     Follow up With Specialties Details Why Contact Info Additional [address]   [address], [address] [facility] [id number]  [address], [address], [address] [facility], [address]          Discharge Medication List as of 1/19/2022 11:36 AM      START taking these medications    Details   amoxicillin (AMOXIL) 500 mg capsule Take 1 capsule (500 mg total) by mouth every 12 (twelve) hours for 7 days, Starting Wed 1/19/2022, Until Wed 1/26/2022, Normal         CONTINUE these medications which have NOT CHANGED    Details   lisinopril (ZESTRIL) 20 mg tablet Take 1 tablet (20 mg total) by mouth daily, Starting Wed 12/30/2020, Print      sertraline (ZOLOFT) 50 mg tablet Take 1 tablet (50 mg total) by mouth daily At 9am, Starting Wed 12/30/2020, Print             No discharge procedures on file      PDMP Review     None          ED Provider  Electronically Signed by           Ta Major MD  01/19/22 2497

## 2022-01-19 NOTE — Clinical Note
Dupree Poag was seen and treated in our emergency department on 1/19/2022  Diagnosis:     Selvin    He may return on this date: If you have any questions or concerns, please don't hesitate to call        Washington Kirkland MD    ______________________________           _______________          _______________  Hospital Representative                              Date                                Time